# Patient Record
(demographics unavailable — no encounter records)

---

## 2025-02-03 NOTE — REASON FOR VISIT
[FreeTextEntry1] : The patient is scheduled for trigger finger surgery (right thumb) He has a history of CAD; last stress test (done routinely) was August 2024 His last echocardiogram was benign (August 2024) He feels well from a cardiac perspective and is w/o cardiac complaints His cardiac exam is benign His ECG is normal

## 2025-02-03 NOTE — ASSESSMENT
[FreeTextEntry1] : He is stable from a cardiac perspective for his proposed procedure. Surgery can proceed as planned without further cardiac testing/evaluations Labs were drawn as per the surgical team's request